# Patient Record
Sex: FEMALE | Race: BLACK OR AFRICAN AMERICAN | NOT HISPANIC OR LATINO | URBAN - METROPOLITAN AREA
[De-identification: names, ages, dates, MRNs, and addresses within clinical notes are randomized per-mention and may not be internally consistent; named-entity substitution may affect disease eponyms.]

---

## 2022-10-19 ENCOUNTER — APPOINTMENT (OUTPATIENT)
Dept: URBAN - METROPOLITAN AREA CLINIC 207 | Age: 32
Setting detail: DERMATOLOGY
End: 2022-10-19

## 2022-10-19 DIAGNOSIS — L81.9 DISORDER OF PIGMENTATION, UNSPECIFIED: ICD-10-CM

## 2022-10-19 DIAGNOSIS — L91.0 HYPERTROPHIC SCAR: ICD-10-CM

## 2022-10-19 PROCEDURE — 99202 OFFICE O/P NEW SF 15 MIN: CPT | Mod: 25

## 2022-10-19 PROCEDURE — OTHER INTRALESIONAL KENALOG: OTHER

## 2022-10-19 PROCEDURE — 11900 INJECT SKIN LESIONS </W 7: CPT

## 2022-10-19 PROCEDURE — OTHER COUNSELING: OTHER

## 2022-10-19 PROCEDURE — OTHER PRESCRIPTION: OTHER

## 2022-10-19 RX ORDER — TRETIONIN 0.5 MG/G
CREAM TOPICAL
Qty: 20 | Refills: 0 | Status: ERX | COMMUNITY
Start: 2022-10-19

## 2022-10-19 ASSESSMENT — LOCATION DETAILED DESCRIPTION DERM: LOCATION DETAILED: LEFT PROXIMAL RADIAL DORSAL FOREARM

## 2022-10-19 ASSESSMENT — LOCATION ZONE DERM: LOCATION ZONE: ARM

## 2022-10-19 ASSESSMENT — LOCATION SIMPLE DESCRIPTION DERM: LOCATION SIMPLE: LEFT FOREARM

## 2022-10-19 NOTE — HPI: SCAR
Is This A New Presentation, Or A Follow-Up?: Scar
Additional History: Patient complains of scar left forearm status post tattoo removal x 3 months ago

## 2022-10-19 NOTE — PROCEDURE: INTRALESIONAL KENALOG
How Many Mls Were Removed From The 40 Mg/Ml (1ml) Vial When Preparing The Injectable Solution?: 0
Kenalog Preparation: Kenalog
Medical Necessity Clause: This procedure was medically necessary because the lesions that were treated were:
Total Volume Injected (Ccs- Only Use Numbers And Decimals): 0.2
Detail Level: Detailed
Concentration Of Solution Injected (Mg/Ml): 10.0
Validate Note Data When Using Inventory: Yes
Include Z78.9 (Other Specified Conditions Influencing Health Status) As An Associated Diagnosis?: No
Consent: Verbal consent obtained. The risks of atrophy and skin lightening were reviewed with the patient.
Show Inventory Tab: Hide

## 2022-11-16 ENCOUNTER — APPOINTMENT (OUTPATIENT)
Dept: URBAN - METROPOLITAN AREA CLINIC 207 | Age: 32
Setting detail: DERMATOLOGY
End: 2022-11-21

## 2022-11-16 DIAGNOSIS — L91.0 HYPERTROPHIC SCAR: ICD-10-CM

## 2022-11-16 PROCEDURE — OTHER COUNSELING: OTHER

## 2022-11-16 PROCEDURE — 11900 INJECT SKIN LESIONS </W 7: CPT

## 2022-11-16 PROCEDURE — OTHER INTRALESIONAL KENALOG: OTHER

## 2022-11-16 PROCEDURE — OTHER ADDITIONAL NOTES: OTHER

## 2022-11-16 ASSESSMENT — LOCATION DETAILED DESCRIPTION DERM: LOCATION DETAILED: LEFT PROXIMAL DORSAL FOREARM

## 2022-11-16 ASSESSMENT — LOCATION SIMPLE DESCRIPTION DERM: LOCATION SIMPLE: LEFT FOREARM

## 2022-11-16 ASSESSMENT — LOCATION ZONE DERM: LOCATION ZONE: ARM

## 2022-11-16 NOTE — PROCEDURE: ADDITIONAL NOTES
Additional Notes: Great improvement\\nContinue applying Tretinoin 0.05% cream\\nRecommended OTC Silicone ScarAway sheets\\nSunscreen SPF 30+ daily\\n4-6 week follow up, if needed
Detail Level: Simple
Render Risk Assessment In Note?: no

## 2024-06-18 ENCOUNTER — P2P PATIENT RECORD (OUTPATIENT)
Age: 34
End: 2024-06-18

## 2024-07-11 ENCOUNTER — DASHBOARD ENCOUNTERS (OUTPATIENT)
Age: 34
End: 2024-07-11

## 2024-07-11 ENCOUNTER — LAB OUTSIDE AN ENCOUNTER (OUTPATIENT)
Dept: URBAN - METROPOLITAN AREA CLINIC 92 | Facility: CLINIC | Age: 34
End: 2024-07-11

## 2024-07-11 ENCOUNTER — OFFICE VISIT (OUTPATIENT)
Dept: URBAN - METROPOLITAN AREA CLINIC 92 | Facility: CLINIC | Age: 34
End: 2024-07-11
Payer: COMMERCIAL

## 2024-07-11 VITALS
TEMPERATURE: 96.8 F | BODY MASS INDEX: 30.15 KG/M2 | DIASTOLIC BLOOD PRESSURE: 73 MMHG | WEIGHT: 210.6 LBS | HEART RATE: 75 BPM | HEIGHT: 70 IN | SYSTOLIC BLOOD PRESSURE: 118 MMHG

## 2024-07-11 DIAGNOSIS — M62.89 PELVIC FLOOR DYSFUNCTION: ICD-10-CM

## 2024-07-11 DIAGNOSIS — F41.9 ANXIETY: ICD-10-CM

## 2024-07-11 DIAGNOSIS — R15.9 FREQUENT FECAL INCONTINENCE: ICD-10-CM

## 2024-07-11 DIAGNOSIS — N39.41 URINARY INCONTINENCE, URGE: ICD-10-CM

## 2024-07-11 DIAGNOSIS — F43.10 PTSD (POST-TRAUMATIC STRESS DISORDER): ICD-10-CM

## 2024-07-11 PROBLEM — 47505003: Status: ACTIVE | Noted: 2024-07-11

## 2024-07-11 PROBLEM — 48694002: Status: ACTIVE | Noted: 2024-07-11

## 2024-07-11 PROBLEM — 460671000124103: Status: ACTIVE | Noted: 2024-07-11

## 2024-07-11 PROBLEM — 87557004: Status: ACTIVE | Noted: 2024-07-11

## 2024-07-11 PROCEDURE — 99204 OFFICE O/P NEW MOD 45 MIN: CPT | Performed by: INTERNAL MEDICINE

## 2024-07-11 RX ORDER — TRIAMCINOLONE ACETONIDE 1 MG/G
1 APPLICATION CREAM TOPICAL
Status: ON HOLD | COMMUNITY

## 2024-07-11 RX ORDER — OMEPRAZOLE 40 MG/1
1 CAPSULE 30 MINUTES BEFORE MORNING MEAL CAPSULE, DELAYED RELEASE ORAL ONCE A DAY
Status: ACTIVE | COMMUNITY

## 2024-07-11 NOTE — PHYSICAL EXAM RECTAL:
normal tone, no external hemorrhoids, no masses palpable, no red blood, Tenderness on NICOL, Internal hemorrhoids present

## 2024-07-11 NOTE — HPI-TODAY'S VISIT:
Magdalena is a 34 yo female with a PMHx of bowel incontinence which has been an issue for 10 years.  She also reports urinary incontinence.  She reports fecal smearing daily.  Bowel movements occur once a day, sometimes she strains.  She was a premie baby but cannot recall how many months she was at birth.  She had to take pills for bed wetting until middle school.  She was told this was due to lack of development during gestation. She has urine leakage daily.

## 2024-07-11 NOTE — PHYSICAL EXAM NEUROLOGIC:
oriented to person, place and time, short and long term memory intact, cooperative with exam, sensory exam intact
Yes

## 2024-07-15 ENCOUNTER — TELEPHONE ENCOUNTER (OUTPATIENT)
Dept: URBAN - METROPOLITAN AREA CLINIC 92 | Facility: CLINIC | Age: 34
End: 2024-07-15

## 2024-08-28 ENCOUNTER — OFFICE VISIT (OUTPATIENT)
Dept: URBAN - METROPOLITAN AREA TELEHEALTH 2 | Facility: TELEHEALTH | Age: 34
End: 2024-08-28

## 2024-09-13 ENCOUNTER — TELEPHONE ENCOUNTER (OUTPATIENT)
Dept: URBAN - METROPOLITAN AREA CLINIC 92 | Facility: CLINIC | Age: 34
End: 2024-09-13

## 2024-11-15 ENCOUNTER — APPOINTMENT (RX ONLY)
Dept: URBAN - METROPOLITAN AREA CLINIC 53 | Facility: CLINIC | Age: 34
Setting detail: DERMATOLOGY
End: 2024-11-15

## 2024-11-15 DIAGNOSIS — L91.0 HYPERTROPHIC SCAR: ICD-10-CM | Status: INADEQUATELY CONTROLLED

## 2024-11-15 PROCEDURE — ? INTRALESIONAL KENALOG

## 2024-11-15 PROCEDURE — ? PRESCRIPTION

## 2024-11-15 PROCEDURE — ? SEPARATE AND IDENTIFIABLE DOCUMENTATION

## 2024-11-15 PROCEDURE — 99204 OFFICE O/P NEW MOD 45 MIN: CPT | Mod: 25

## 2024-11-15 PROCEDURE — ? COUNSELING

## 2024-11-15 PROCEDURE — 11900 INJECT SKIN LESIONS </W 7: CPT

## 2024-11-15 PROCEDURE — ? PRESCRIPTION MEDICATION MANAGEMENT

## 2024-11-15 RX ORDER — CLOBETASOL PROPIONATE 0.5 MG/G
CREAM TOPICAL
Qty: 45 | Refills: 0 | Status: ERX | COMMUNITY
Start: 2024-11-15

## 2024-11-15 RX ADMIN — CLOBETASOL PROPIONATE: 0.5 CREAM TOPICAL at 00:00

## 2024-11-15 ASSESSMENT — LOCATION ZONE DERM: LOCATION ZONE: ARM

## 2024-11-15 ASSESSMENT — LOCATION DETAILED DESCRIPTION DERM
LOCATION DETAILED: LEFT PROXIMAL DORSAL FOREARM
LOCATION DETAILED: LEFT DISTAL DORSAL FOREARM

## 2024-11-15 ASSESSMENT — LOCATION SIMPLE DESCRIPTION DERM: LOCATION SIMPLE: LEFT FOREARM

## 2024-11-15 NOTE — HPI: SCAR
How Severe Is Your Scar?: moderate
Is This A New Presentation, Or A Follow-Up?: Scar
Additional History: Pt wanted to know what options she has to treat the scars in reference to flatten them out and decrease redness.
(2) well flexed

## 2024-11-15 NOTE — PROCEDURE: INTRALESIONAL KENALOG
Treatment Number (Optional): 1
Detail Level: Detailed
How Many Mls Were Removed From The 40 Mg/Ml (5ml) Vial When Preparing The Injectable Solution?: 0
Bill For Wasted Drug (Kenalog)?: no
Kenalog Preparation: Kenalog
Ndc# For Kenalog Only: 2542-5846-15
Validate Note Data When Using Inventory: Yes
Total Volume (Ccs): 2.0
Lot # For Kenalog (Optional): [id number]
Medical Necessity Clause: This procedure was medically necessary because the lesions that were treated were:
Kenalog Type Of Vial: Multiple Dose
Administered By (Optional): ROMEO Betts MD
Concentration Of Kenalog Solution Injected (Mg/Ml): 2.5
Show Inventory Tab: Hide
Consent: The risks of atrophy were reviewed with the patient.
Expiration Date For Kenalog (Optional): MARCH 2026

## 2024-11-15 NOTE — PROCEDURE: PRESCRIPTION MEDICATION MANAGEMENT
Render In Strict Bullet Format?: No
Initiate Treatment: clobetasol 0.05 % topical cream - Apply a thin layer once daily to affected area of arm Monday through Friday.\\n-
Plan: Pt is coming in to treat hypertrophic scar caused by tattoo removal. Today we injected the more thicker parts with kenalog and prescribed clobetasol she will use as directed at todays visit. She will apply the cream and wrap with saran wrap. She will rtc in 6 weeks for further evaluation.
Detail Level: Zone

## 2025-01-21 ENCOUNTER — APPOINTMENT (OUTPATIENT)
Dept: URBAN - METROPOLITAN AREA CLINIC 53 | Facility: CLINIC | Age: 35
Setting detail: DERMATOLOGY
End: 2025-01-21

## 2025-01-21 DIAGNOSIS — L91.0 HYPERTROPHIC SCAR: ICD-10-CM

## 2025-01-21 PROCEDURE — ? ADDITIONAL NOTES

## 2025-01-21 PROCEDURE — 99214 OFFICE O/P EST MOD 30 MIN: CPT | Mod: 25

## 2025-01-21 PROCEDURE — ? PRESCRIPTION MEDICATION MANAGEMENT

## 2025-01-21 PROCEDURE — ? INTRALESIONAL KENALOG

## 2025-01-21 PROCEDURE — 11900 INJECT SKIN LESIONS </W 7: CPT

## 2025-01-21 PROCEDURE — ? COUNSELING

## 2025-01-21 PROCEDURE — ? SEPARATE AND IDENTIFIABLE DOCUMENTATION

## 2025-01-21 PROCEDURE — ? PRESCRIPTION

## 2025-01-21 RX ORDER — CLOBETASOL PROPIONATE 0.5 MG/G
CREAM TOPICAL
Qty: 45 | Refills: 0 | Status: ERX

## 2025-01-21 ASSESSMENT — LOCATION DETAILED DESCRIPTION DERM
LOCATION DETAILED: LEFT DISTAL DORSAL FOREARM
LOCATION DETAILED: LEFT PROXIMAL DORSAL FOREARM

## 2025-01-21 ASSESSMENT — LOCATION ZONE DERM: LOCATION ZONE: ARM

## 2025-01-21 ASSESSMENT — LOCATION SIMPLE DESCRIPTION DERM: LOCATION SIMPLE: LEFT FOREARM

## 2025-01-21 NOTE — PROCEDURE: ADDITIONAL NOTES
Additional Notes: Discussed treating with laser resurfacing after patient completed tattoo removal sessions.
Detail Level: Simple
Render Risk Assessment In Note?: no

## 2025-01-21 NOTE — PROCEDURE: PRESCRIPTION MEDICATION MANAGEMENT
Render In Strict Bullet Format?: No
Continue Regimen: clobetasol 0.05 % topical cream - Apply a thin layer once daily to affected area of arm Monday through Friday.\\n-
Detail Level: Zone

## 2025-01-21 NOTE — PROCEDURE: INTRALESIONAL KENALOG
Treatment Number (Optional): 2
Detail Level: Detailed
How Many Mls Were Removed From The 40 Mg/Ml (5ml) Vial When Preparing The Injectable Solution?: 0
Bill For Wasted Drug (Kenalog)?: no
Kenalog Preparation: Kenalog
Ndc# For [place] Only: [address]-22
Validate Note Data When Using Inventory: Yes
Total Volume (Ccs): 2.0
Lot # For Kenalog (Optional): [id number]
Medical Necessity Clause: This procedure was medically necessary because the lesions that were treated were:
Kenalog Type Of Vial: Multiple Dose
Administered By (Optional): ROMEO Betts MD
Concentration Of Kenalog Solution Injected (Mg/Ml): 2.5
Show Inventory Tab: Hide
Consent: The risks of atrophy were reviewed with the patient.
Expiration Date For Kenalog (Optional): MARCH 2026

## 2025-02-21 ENCOUNTER — APPOINTMENT (OUTPATIENT)
Dept: URBAN - METROPOLITAN AREA CLINIC 53 | Facility: CLINIC | Age: 35
Setting detail: DERMATOLOGY
End: 2025-02-21

## 2025-02-21 ENCOUNTER — RX ONLY (RX ONLY)
Age: 35
End: 2025-02-21

## 2025-02-21 DIAGNOSIS — L91.0 HYPERTROPHIC SCAR: ICD-10-CM

## 2025-02-21 PROCEDURE — ? PRESCRIPTION MEDICATION MANAGEMENT

## 2025-02-21 PROCEDURE — ? COUNSELING

## 2025-02-21 PROCEDURE — ? INTRALESIONAL KENALOG

## 2025-02-21 PROCEDURE — ? ADDITIONAL NOTES

## 2025-02-21 PROCEDURE — 11901 INJECT SKIN LESIONS >7: CPT

## 2025-02-21 RX ORDER — CLOBETASOL PROPIONATE 0.5 MG/G
CREAM TOPICAL
Qty: 45 | Refills: 1 | Status: CANCELLED

## 2025-02-21 RX ORDER — CLOBETASOL PROPIONATE 0.5 MG/G
CREAM TOPICAL
Qty: 45 | Refills: 1 | Status: ERX

## 2025-02-21 ASSESSMENT — LOCATION DETAILED DESCRIPTION DERM
LOCATION DETAILED: LEFT PROXIMAL ULNAR DORSAL FOREARM
LOCATION DETAILED: LEFT PROXIMAL RADIAL DORSAL FOREARM
LOCATION DETAILED: LEFT DISTAL DORSAL FOREARM
LOCATION DETAILED: LEFT PROXIMAL DORSAL FOREARM

## 2025-02-21 ASSESSMENT — LOCATION SIMPLE DESCRIPTION DERM: LOCATION SIMPLE: LEFT FOREARM

## 2025-02-21 ASSESSMENT — LOCATION ZONE DERM: LOCATION ZONE: ARM

## 2025-02-21 NOTE — PROCEDURE: ADDITIONAL NOTES
Render Risk Assessment In Note?: no
Detail Level: Simple
Additional Notes: Pt expressed she is also getting laser tattoo removal by [facility]. She has completed about 5 sessions so far. Provider recommended them going higher in the setting also recommended pt applies topical numbing a few hours prior to the laser.

## 2025-02-21 NOTE — PROCEDURE: PRESCRIPTION MEDICATION MANAGEMENT
Render In Strict Bullet Format?: No
Continue Regimen: clobetasol 0.05 % topical cream - Apply a thin layer once daily to affected area of arm Monday through Friday.\\n-
Plan: Rx has been refilled and sent to pharmacy.
Detail Level: Zone

## 2025-02-21 NOTE — PROCEDURE: INTRALESIONAL KENALOG
Treatment Number (Optional): 2
Detail Level: Detailed
How Many Mls Were Removed From The 40 Mg/Ml (5ml) Vial When Preparing The Injectable Solution?: 0
Bill For Wasted Drug (Kenalog)?: no
Kenalog Preparation: Kenalog
Ndc# For [place] Only: [address]-76
Validate Note Data When Using Inventory: Yes
Total Volume (Ccs): 4
Lot # For Kenalog (Optional): [id number]
Medical Necessity Clause: This procedure was medically necessary because the lesions that were treated were:
Kenalog Type Of Vial: Multiple Dose
Administered By (Optional): ROMEO Betts MD
Concentration Of Kenalog Solution Injected (Mg/Ml): 5.0
Show Inventory Tab: Hide
Consent: The risks of atrophy were reviewed with the patient.
Expiration Date For Kenalog (Optional): MARCH 2026